# Patient Record
Sex: MALE | Race: WHITE | NOT HISPANIC OR LATINO | URBAN - METROPOLITAN AREA
[De-identification: names, ages, dates, MRNs, and addresses within clinical notes are randomized per-mention and may not be internally consistent; named-entity substitution may affect disease eponyms.]

---

## 2019-08-19 ENCOUNTER — PROCEDURE VISIT (OUTPATIENT)
Dept: SURGERY | Facility: CLINIC | Age: 79
End: 2019-08-19
Payer: MEDICARE

## 2019-08-19 DIAGNOSIS — L72.3 INFECTED SEBACEOUS CYST: Primary | ICD-10-CM

## 2019-08-19 DIAGNOSIS — L08.9 INFECTED SEBACEOUS CYST: Primary | ICD-10-CM

## 2019-08-19 PROCEDURE — 11441 EXC FACE-MM B9+MARG 0.6-1 CM: CPT | Mod: S$GLB,,, | Performed by: SURGERY

## 2019-08-19 PROCEDURE — 11441 PR EXC SKIN BENIG 0.6-1 CM FACE,FACIAL: ICD-10-PCS | Mod: S$GLB,,, | Performed by: SURGERY

## 2019-08-19 RX ORDER — BICALUTAMIDE 50 MG/1
50 TABLET, FILM COATED ORAL DAILY
Refills: 5 | COMMUNITY
Start: 2019-07-25

## 2019-08-19 NOTE — PROCEDURES
Exc, Cyst  Date/Time: 8/19/2019 3:28 PM  Performed by: Philip Anthony MD  Authorized by: Philip Anthony MD       STAFF:  Assistants?: No    INDICATIONS:cyst  LOCATION:  Body area:  Face/facial  Face area:  Foreheadright    PREP:Position:  Supine    ANESTHESIA:  Anesthesia:  Local infiltration  Local anesthetic:  Lidocaine 1% with epinephrine    PROCEDURE DETAILS:  Excision size (cm):  1  Scalpel size:  15  Incision type:  Single with marsupialization  Specimens?: No      Sterile dressings:  Gauze  Post-operative instructions were provided for the patient.

## 2023-04-24 ENCOUNTER — TELEPHONE (OUTPATIENT)
Dept: GASTROENTEROLOGY | Facility: CLINIC | Age: 83
End: 2023-04-24
Payer: MEDICARE

## 2023-04-24 NOTE — TELEPHONE ENCOUNTER
----- Message from Shannen Jeronimo RN sent at 4/24/2023 10:37 AM CDT -----  Contact: self    ----- Message -----  From: Florecita Francois  Sent: 4/24/2023  10:12 AM CDT  To: [facility] Staff    Pt needs to schedule colonoscopy pt needs a MON, TUE or THUR appt pls call [phone]  With appt details

## 2023-04-25 ENCOUNTER — OFFICE VISIT (OUTPATIENT)
Dept: GASTROENTEROLOGY | Facility: CLINIC | Age: 83
End: 2023-04-25
Payer: MEDICARE

## 2023-04-25 VITALS
SYSTOLIC BLOOD PRESSURE: 147 MMHG | HEIGHT: 71 IN | HEART RATE: 68 BPM | WEIGHT: 181.63 LBS | OXYGEN SATURATION: 98 % | BODY MASS INDEX: 25.43 KG/M2 | DIASTOLIC BLOOD PRESSURE: 72 MMHG

## 2023-04-25 DIAGNOSIS — Z86.010 HISTORY OF COLON POLYPS: Primary | ICD-10-CM

## 2023-04-25 PROCEDURE — 99499 NO LOS: ICD-10-PCS | Mod: S$GLB,,,

## 2023-04-25 PROCEDURE — 99499 UNLISTED E&M SERVICE: CPT | Mod: S$GLB,,,

## 2023-04-25 RX ORDER — POLYETHYLENE GLYCOL 3350, SODIUM SULFATE ANHYDROUS, SODIUM BICARBONATE, SODIUM CHLORIDE, POTASSIUM CHLORIDE 236; 22.74; 6.74; 5.86; 2.97 G/4L; G/4L; G/4L; G/4L; G/4L
4 POWDER, FOR SOLUTION ORAL ONCE
Qty: 4000 ML | Refills: 0 | Status: SHIPPED | OUTPATIENT
Start: 2023-04-25 | End: 2023-04-25

## 2023-04-25 RX ORDER — MULTIVITAMIN
1 TABLET ORAL DAILY
COMMUNITY

## 2023-04-25 RX ORDER — SIMVASTATIN 10 MG/1
10 TABLET, FILM COATED ORAL NIGHTLY
COMMUNITY

## 2023-04-25 NOTE — PATIENT INSTRUCTIONS
Schedule colonoscopy with Dr. Tobar.     Please notify my office if you have not been contacted within two weeks after any procedures, submitting any samples (biopsies, blood, stool, urine, etc.) or after any imaging (X-ray, CT, MRI, etc.).

## 2023-04-25 NOTE — PROGRESS NOTES
Clinic Note    Reason for visit:  The encounter diagnosis was History of colon polyps.    PCP: Richy Murray       HPI:  This is a 83 y.o. male who was last seen by Dr. Tobar in 2019. Patient denies any reflux, dysphagia, abdominal pain, constipation, diarrhea, or blood in stool.       Colonoscopy 2/20/2017: Small grade 1 internal hemorrhoids were noted. Repeat colon in 5 yr due to h/o colon polyps    Review of Systems   Constitutional:  Negative for chills, diaphoresis, fatigue, fever and unexpected weight change.   HENT:  Negative for hearing loss, mouth sores, nosebleeds, postnasal drip, sore throat, trouble swallowing and voice change.    Eyes:  Negative for pain, discharge and eye dryness.   Respiratory:  Negative for apnea, cough, choking, chest tightness, shortness of breath and wheezing.    Cardiovascular:  Negative for chest pain, palpitations, leg swelling and claudication.   Gastrointestinal:  Negative for abdominal distention, abdominal pain, anal bleeding, blood in stool, change in bowel habit, constipation, diarrhea, nausea, rectal pain, vomiting, reflux, fecal incontinence and change in bowel habit.   Genitourinary:  Negative for bladder incontinence, difficulty urinating, dysuria, flank pain, frequency and hematuria.   Musculoskeletal:  Negative for arthralgias, back pain, joint swelling and joint deformity.   Integumentary:  Negative for color change, rash and wound.   Allergic/Immunologic: Negative for environmental allergies and food allergies.   Neurological:  Negative for seizures, facial asymmetry, speech difficulty, weakness, headaches and memory loss.   Hematological:  Negative for adenopathy. Does not bruise/bleed easily.   Psychiatric/Behavioral:  Negative for agitation, behavioral problems, confusion, hallucinations and sleep disturbance.       Past Medical History:   Diagnosis Date    BMI 25.0-25.9,adult     Colon polyp     High cholesterol     Prostate cancer      Past Surgical  "History:   Procedure Laterality Date    CHOLECYSTECTOMY N/A     COLON RESECTION N/A     COLONOSCOPY N/A     INGUINAL HERNIA REPAIR      bilateral    RADICAL PROSTATECTOMY N/A      Family History   Problem Relation Age of Onset    Alzheimer's disease Mother     Liver cancer Neg Hx     Liver disease Neg Hx     Pancreatic cancer Neg Hx     Colon cancer Neg Hx     Stomach cancer Neg Hx     Esophageal cancer Neg Hx     Throat cancer Neg Hx     Ulcerative colitis Neg Hx     Crohn's disease Neg Hx      Social History     Tobacco Use    Smoking status: Former     Types: Cigarettes     Quit date: 1960     Years since quittin.3    Smokeless tobacco: Never   Substance Use Topics    Alcohol use: Yes     Alcohol/week: 5.0 standard drinks     Types: 5 Glasses of wine per week    Drug use: Never     Review of patient's allergies indicates:   Allergen Reactions    Demerol [meperidine] Itching      Medication List with Changes/Refills   New Medications    POLYETHYLENE GLYCOL (GOLYTELY) 236-22.74-6.74 -5.86 GRAM SUSPENSION    Take 4,000 mLs (4 L total) by mouth once. for 1 dose   Current Medications    BICALUTAMIDE (CASODEX) 50 MG TAB    Take 50 mg by mouth once daily.    MULTIVITAMIN (ONE DAILY MULTIVITAMIN) PER TABLET    Take 1 tablet by mouth once daily.    SIMVASTATIN (ZOCOR) 10 MG TABLET    Take 10 mg by mouth every evening.         Vital Signs:  BP (!) 147/72   Pulse 68   Ht 5' 11" (1.803 m)   Wt 82.4 kg (181 lb 9.6 oz)   SpO2 98%   BMI 25.33 kg/m²        Physical Exam  Constitutional:       General: He is not in acute distress.     Appearance: Normal appearance. He is well-developed. He is not ill-appearing or toxic-appearing.   HENT:      Head: Normocephalic and atraumatic.      Nose: Nose normal.      Mouth/Throat:      Mouth: Mucous membranes are moist.      Pharynx: Oropharynx is clear. No oropharyngeal exudate or posterior oropharyngeal erythema.   Eyes:      General: Lids are normal. No scleral icterus.        " Right eye: No discharge.         Left eye: No discharge.      Extraocular Movements: Extraocular movements intact.      Conjunctiva/sclera: Conjunctivae normal.   Cardiovascular:      Rate and Rhythm: Normal rate and regular rhythm.      Pulses:           Radial pulses are 2+ on the right side and 2+ on the left side.   Pulmonary:      Effort: Pulmonary effort is normal. No respiratory distress.      Breath sounds: No stridor. No wheezing or rhonchi.   Abdominal:      General: Bowel sounds are normal. There is no distension.      Palpations: Abdomen is soft. There is no fluid wave, hepatomegaly, splenomegaly or mass.      Tenderness: There is no abdominal tenderness. There is no guarding or rebound.   Musculoskeletal:      Cervical back: Full passive range of motion without pain.      Right lower leg: No edema.      Left lower leg: No edema.   Lymphadenopathy:      Cervical: No cervical adenopathy.   Skin:     General: Skin is warm and dry.      Capillary Refill: Capillary refill takes less than 2 seconds.      Coloration: Skin is not cyanotic, jaundiced or pale.      Findings: No rash.   Neurological:      General: No focal deficit present.      Mental Status: He is alert and oriented to person, place, and time.   Psychiatric:         Mood and Affect: Mood normal.         Behavior: Behavior is cooperative.          All of the data above and below has been reviewed by myself and any further interpretations will be reflected in the assessment and plan.   The data includes review of external notes, and independent interpretation of lab results, procedures, x-rays, and imaging reports.      Assessment:  History of colon polyps  -     Ambulatory Referral to External Surgery  -     polyethylene glycol (GOLYTELY) 236-22.74-6.74 -5.86 gram suspension; Take 4,000 mLs (4 L total) by mouth once. for 1 dose  Dispense: 4000 mL; Refill: 0         Recommendations:  Schedule colonoscopy with Dr. Tobar at [facility] with  golytely    Risks, benefits, and alternatives of medical management, any associated procedures, and/or treatment discussed with the patient. Patient given opportunity to ask questions and voices understanding. Patient has elected to proceed with the recommended care modalities as discussed.    Follow up if symptoms worsen or fail to improve.    Order summary:  Orders Placed This Encounter   Procedures    Ambulatory Referral to External Surgery        Instructed patient to notify my office if they have not been contacted within two weeks after any procedures, submitting any samples (biopsies, blood, stool, urine, etc.) or after any imaging (X-ray, CT, MRI, etc.).      Cici Yanez NP    This document may have been created using a voice recognition transcribing system. Incorrect words or phrases may have been missed during proofreading. Please interpret accordingly or contact me for clarification.

## 2023-06-06 ENCOUNTER — TELEPHONE (OUTPATIENT)
Dept: GASTROENTEROLOGY | Facility: CLINIC | Age: 83
End: 2023-06-06
Payer: MEDICARE

## 2023-06-06 NOTE — TELEPHONE ENCOUNTER
I spoke to Patient and his rx is at the pharmacy for his prep and I informed him Deisi will call him the day edore the procedure for his arrival time

## 2023-06-06 NOTE — TELEPHONE ENCOUNTER
----- Message from Neva Dias sent at 6/6/2023  8:29 AM CDT -----  Regarding: procedure info  Contact: patient  PT is calling to fins out when his medication for his procedure will be called out to the pharmacy and also what time he needs to be there, return call [phone]      [facility] [address] [address]  Phone: [phone] Fax: [phone]

## 2023-06-09 DIAGNOSIS — Z86.010 HISTORY OF COLON POLYPS: Primary | ICD-10-CM

## 2023-06-09 NOTE — PROGRESS NOTES
"[facility]  [address] [place] [address]  Phone: [phone]  Fax: [phone]    History & Physical         Provider: Dr. Twin Tobar    Patient Name: Geoff MAYNARD (age):1940  83 y.o.           Gender: male   Phone: [phone]     Referring Physician: Richy Murray     Vital Signs:   Height - 5' 11"  Weight - 181 lbs  BMI -  25.2    Plan: Colonoscopy    Encounter Diagnosis   Name Primary?    History of colon polyps Yes           History:      Past Medical History:   Diagnosis Date    BMI 25.0-25.9,adult     Colon polyp     High cholesterol     Prostate cancer       Past Surgical History:   Procedure Laterality Date    CHOLECYSTECTOMY N/A     COLON RESECTION N/A     COLONOSCOPY N/A     INGUINAL HERNIA REPAIR      bilateral    RADICAL PROSTATECTOMY N/A       Medication List with Changes/Refills   Current Medications    BICALUTAMIDE (CASODEX) 50 MG TAB    Take 50 mg by mouth once daily.    MULTIVITAMIN (ONE DAILY MULTIVITAMIN) PER TABLET    Take 1 tablet by mouth once daily.    SIMVASTATIN (ZOCOR) 10 MG TABLET    Take 10 mg by mouth every evening.      Review of patient's allergies indicates:   Allergen Reactions    Demerol [meperidine] Itching      Family History   Problem Relation Age of Onset    Alzheimer's disease Mother     Liver cancer Neg Hx     Liver disease Neg Hx     Pancreatic cancer Neg Hx     Colon cancer Neg Hx     Stomach cancer Neg Hx     Esophageal cancer Neg Hx     Throat cancer Neg Hx     Ulcerative colitis Neg Hx     Crohn's disease Neg Hx       Social History     Tobacco Use    Smoking status: Former     Types: Cigarettes     Quit date: 1960     Years since quittin.4    Smokeless tobacco: Never   Substance Use Topics    Alcohol use: Yes     Alcohol/week: 5.0 standard drinks     Types: 5 Glasses of wine per week    Drug use: Never        Physical Examination: "     General Appearance:___________________________  HEENT: _____________________________________  Abdomen:____________________________________  Heart:________________________________________  Lungs:_______________________________________  Extremities:___________________________________  Skin:_________________________________________  Endocrine:____________________________________  Genitourinary:_________________________________  Neurological:__________________________________      Patient has been evaluated immediately prior to sedation and is medically cleared for endoscopy with IVCS as an ASA class: ______      Physician Signature: _________________________       Date: ________  Time: ________

## 2023-06-13 ENCOUNTER — OUTSIDE PLACE OF SERVICE (OUTPATIENT)
Dept: GASTROENTEROLOGY | Facility: CLINIC | Age: 83
End: 2023-06-13

## 2023-06-13 LAB — CRC RECOMMENDATION EXT: NORMAL

## 2023-06-13 PROCEDURE — 45385 COLONOSCOPY W/LESION REMOVAL: CPT | Mod: PT,,, | Performed by: INTERNAL MEDICINE

## 2023-06-13 PROCEDURE — 45385 PR COLONOSCOPY,REMV LESN,SNARE: ICD-10-PCS | Mod: PT,,, | Performed by: INTERNAL MEDICINE

## 2023-07-18 ENCOUNTER — DOCUMENTATION ONLY (OUTPATIENT)
Dept: GASTROENTEROLOGY | Facility: CLINIC | Age: 83
End: 2023-07-18
Payer: MEDICARE

## 2024-05-06 ENCOUNTER — OFFICE VISIT (OUTPATIENT)
Dept: SURGERY | Facility: CLINIC | Age: 84
End: 2024-05-06
Payer: MEDICARE

## 2024-05-06 DIAGNOSIS — L08.9 INFECTED SEBACEOUS CYST OF SKIN: Primary | ICD-10-CM

## 2024-05-06 DIAGNOSIS — L72.3 INFECTED SEBACEOUS CYST OF SKIN: Primary | ICD-10-CM

## 2024-05-06 PROCEDURE — 99213 OFFICE O/P EST LOW 20 MIN: CPT | Mod: 25,S$GLB,, | Performed by: SURGERY

## 2024-05-06 PROCEDURE — 10060 I&D ABSCESS SIMPLE/SINGLE: CPT | Mod: S$GLB,,, | Performed by: SURGERY

## 2024-05-06 NOTE — PROGRESS NOTES
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  Presents today with a about a week history of a recurrent sebaceous cyst that has become infected in his tender with surrounding erythema and purulent drainage in the mid back region.  He has not on antibiotics    Chief Complaint   Patient presents with    Cyst         Review of patient's allergies indicates:  Review of patient's allergies indicates:   Allergen Reactions    Demerol [meperidine] Itching       Current Outpatient Medications on File Prior to Visit   Medication Sig Dispense Refill    multivitamin (ONE DAILY MULTIVITAMIN) per tablet Take 1 tablet by mouth once daily.      simvastatin (ZOCOR) 10 MG tablet Take 10 mg by mouth every evening.      [DISCONTINUED] bicalutamide (CASODEX) 50 MG Tab Take 50 mg by mouth once daily.  5     No current facility-administered medications on file prior to visit.       Past Medical History:   Diagnosis Date    BMI 25.0-25.9,adult     Colon polyp     High cholesterol     Prostate cancer      Past Surgical History:   Procedure Laterality Date    CHOLECYSTECTOMY N/A     COLON RESECTION N/A     COLONOSCOPY N/A     INGUINAL HERNIA REPAIR      bilateral    RADICAL PROSTATECTOMY N/A      Family History   Problem Relation Name Age of Onset    Alzheimer's disease Mother      Liver cancer Neg Hx      Liver disease Neg Hx      Pancreatic cancer Neg Hx      Colon cancer Neg Hx      Stomach cancer Neg Hx      Esophageal cancer Neg Hx      Throat cancer Neg Hx      Ulcerative colitis Neg Hx      Crohn's disease Neg Hx         Social History     Socioeconomic History    Marital status:    Tobacco Use    Smoking status: Former     Current packs/day: 0.00     Types: Cigarettes     Quit date:      Years since quittin.3    Smokeless tobacco: Never   Substance and  Sexual Activity    Alcohol use: Yes     Alcohol/week: 5.0 standard drinks of alcohol     Types: 5 Glasses of wine per week    Drug use: Never          Review of Systems   Constitutional: Negative.    Respiratory: Negative.     Cardiovascular: Negative.    Gastrointestinal: Negative.    Musculoskeletal:  Positive for joint pain.   Neurological: Negative.    Endo/Heme/Allergies: Negative.    Psychiatric/Behavioral: Negative.         OBJECTIVE:     There were no vitals filed for this visit.              Physical Exam:  Physical Exam  Constitutional:       Appearance: He is normal weight.   HENT:      Head: Normocephalic.   Eyes:      Pupils: Pupils are equal, round, and reactive to light.   Cardiovascular:      Rate and Rhythm: Normal rate.   Pulmonary:      Effort: Pulmonary effort is normal.   Musculoskeletal:         General: Normal range of motion.   Skin:     General: Skin is dry.             Comments: Mid right upper back is noted to be a 2 x 2 cm skin lesion with purulent drainage coming from multiple openings in the skin with surrounding erythema and induration.   Neurological:      General: No focal deficit present.      Mental Status: He is alert and oriented to person, place, and time.      Cranial Nerves: No cranial nerve deficit.   Psychiatric:         Mood and Affect: Mood normal.         Behavior: Behavior normal.             ASSESSMENT/PLAN:   Infected sebaceous cyst with abscess mid back  PLAN:  Incision and drainage will be performed today in the office under local anesthesia.  Wound will be packed and wound care instructions will be given for daily packing with normal saline moistened gauze.  Revisit next Wednesday.  See procedure note

## 2024-05-06 NOTE — PROCEDURES
Incision & Drainage 2 cm infected sebaceous cyst    Date/Time: 5/6/2024 8:45 AM    Performed by: Philip Anthony MD  Authorized by: Philip Anthony MD    Consent Done?:  Yes (Verbal)    Type:  Abscess  Body area:  Trunk  Location details:  Back  Local anesthetic: Lidocaine 1% with epinephrine  Anesthetic total (ml):  10  Scalpel size:  15  Incision type:  Elliptical  Incision depth: subcutaneous    Complexity:  Simple  Drainage:  Pus  Wound treatment:  Wound left open and wound packed  Packing material: 4 x 4 gauze.  Patient tolerance:  Patient tolerated the procedure well with no immediate complications

## 2024-05-15 ENCOUNTER — OFFICE VISIT (OUTPATIENT)
Dept: SURGERY | Facility: CLINIC | Age: 84
End: 2024-05-15
Payer: MEDICARE

## 2024-05-15 VITALS — BODY MASS INDEX: 23.94 KG/M2 | WEIGHT: 171 LBS | HEIGHT: 71 IN

## 2024-05-15 DIAGNOSIS — Z98.890 POST-OPERATIVE STATE: Primary | ICD-10-CM

## 2024-05-15 PROCEDURE — 99024 POSTOP FOLLOW-UP VISIT: CPT | Mod: S$GLB,POP,, | Performed by: SURGERY

## 2024-05-15 NOTE — PROGRESS NOTES
HPI:  Visit for wound check status post incision and drainage of an infected sebaceous cyst from his right upper back.  He has been doing wet-to-dry normal saline gauze dressing changes daily    PHYSICAL EXAM:   Dressing is removed.  Wound shows 100% granulation with no purulent drainage.  Wound depth only about a half a cm.  2.5 cm long and 1.5 cm wide.  Wound redressed  ASSESSMENT:     Interval healing of wound from right upper back at site of infected sebaceous cyst  PLAN:       Continue packing until closed.  Revisit as needed

## 2025-07-23 ENCOUNTER — OFFICE VISIT (OUTPATIENT)
Dept: SURGERY | Facility: CLINIC | Age: 85
End: 2025-07-23
Payer: MEDICARE

## 2025-07-23 DIAGNOSIS — L73.9 ACUTE FOLLICULITIS: Primary | ICD-10-CM

## 2025-07-23 PROCEDURE — 99212 OFFICE O/P EST SF 10 MIN: CPT | Mod: S$PBB,,, | Performed by: SURGERY

## 2025-07-23 NOTE — PROGRESS NOTES
History & Physical    SUBJECTIVE:     History of Present Illness:    85-year-old male with a small boil right suprapubic region around hair follicle.  Self treating with gentamicin ointment and has noted improvement.  Minimal if any tenderness.  No significant drainage    Chief Complaint   Patient presents with    Lump     Groin lump          Review of patient's allergies indicates:  Review of patient's allergies indicates:   Allergen Reactions    Demerol [meperidine] Itching       Medications Ordered Prior to Encounter[1]    Past Medical History:   Diagnosis Date    BMI 25.0-25.9,adult     Colon polyp     High cholesterol     Prostate cancer      Past Surgical History:   Procedure Laterality Date    CHOLECYSTECTOMY N/A     COLON RESECTION N/A     COLONOSCOPY N/A     INGUINAL HERNIA REPAIR      bilateral    RADICAL PROSTATECTOMY N/A      Family History   Problem Relation Name Age of Onset    Alzheimer's disease Mother      Liver cancer Neg Hx      Liver disease Neg Hx      Pancreatic cancer Neg Hx      Colon cancer Neg Hx      Stomach cancer Neg Hx      Esophageal cancer Neg Hx      Throat cancer Neg Hx      Ulcerative colitis Neg Hx      Crohn's disease Neg Hx         Social History[2]       ROS    OBJECTIVE:     There were no vitals filed for this visit.              Physical Exam:  Physical Exam  Cardiovascular:      Rate and Rhythm: Normal rate and regular rhythm.   Pulmonary:      Effort: Pulmonary effort is normal.      Breath sounds: Normal breath sounds.   Abdominal:          Comments: Right suprapubic area superficial area of erythema without underlying abscess measuring about 1.2 cm.             ASSESSMENT/PLAN:   Right suprapubic folliculitis  PLAN:  Recommend continue conservative therapy with antibiotic ointment.  If worsens we can perform incision and drainage is necessary.  Patient will return if worsens               [1]   Current Outpatient Medications on File Prior to Visit   Medication Sig  Dispense Refill    multivitamin (ONE DAILY MULTIVITAMIN) per tablet Take 1 tablet by mouth once daily.      simvastatin (ZOCOR) 10 MG tablet Take 10 mg by mouth every evening.       No current facility-administered medications on file prior to visit.   [2]   Social History  Socioeconomic History    Marital status:    Tobacco Use    Smoking status: Former     Current packs/day: 0.00     Types: Cigarettes     Quit date:      Years since quittin.6    Smokeless tobacco: Never   Substance and Sexual Activity    Alcohol use: Yes     Alcohol/week: 5.0 standard drinks of alcohol     Types: 5 Glasses of wine per week     Comment: ocassionally    Drug use: Never